# Patient Record
(demographics unavailable — no encounter records)

---

## 2025-01-02 NOTE — DISCUSSION/SUMMARY
[Normal Growth] : growth [Normal Development] : development  [No Elimination Concerns] : elimination [Continue Regimen] : feeding [No Skin Concerns] : skin [Normal Sleep Pattern] : sleep [None] : no medical problems [Anticipatory Guidance Given] : Anticipatory guidance addressed as per the history of present illness section [School] : school [Development and Mental Health] : development and mental health [Nutrition and Physical Activity] : nutrition and physical activity [Oral Health] : oral health [Safety] : safety [No Vaccines] : no vaccines needed [No Medications] : ~He/She~ is not on any medications [Patient] : patient [Parent/Guardian] : Parent/Guardian [] : The components of the vaccine(s) to be administered today are listed in the plan of care. The disease(s) for which the vaccine(s) are intended to prevent and the risks have been discussed with the caretaker.  The risks are also included in the appropriate vaccination information statements which have been provided to the patient's caregiver.  The caregiver has given consent to vaccinate. [FreeTextEntry1] : 10 year female here for well visit. Normal growth and development observed unless otherwise listed. Continue balanced diet with all food groups. Brush teeth twice a day with toothbrush. Recommend visit to dentist. Help child to maintain consistent daily routines and sleep schedule. Personal hygiene and puberty explained. School discussed. Ensure home is safe. Teach child about personal safety. Use consistent, positive discipline. Limit screen time to no more than 2 hours per day. Encourage physical activity-- recommend at least an hour per day. Return 1 year for routine well child check.    Passed hearing test in office today.   Referral to therapist and family therapist for behavior issues.  Vaccine Information Sheet(s) given for appropriate vaccines. The components of the vaccine(s) to be administered today are listed in the plan of care. We discussed common side effects and education on the vaccine was provided including the disease(s) for which the vaccine(s) are intended to prevent as well as any risks. Denies any questions. Consent was given to vaccinate. Tdap given in left arm by LPN.  Refer to dermatologist for hair loss-- diagnosed with seborrheic dermatitis in [place] and treated with griseofulvin however might be more of a tinea capitis due to the alopecia.. sending over 2 weeks worth of medication at same dosage they were giving her but mom is aware she will need to follow up with dermatologist for continuation of medication and monitoring.  Mom is requesting a full bloodwork panel as well, advised LFTs should be checked on this medication as well.

## 2025-01-02 NOTE — PHYSICAL EXAM
[Alert] : alert [No Acute Distress] : no acute distress [Normocephalic] : normocephalic [Conjunctivae with no discharge] : conjunctivae with no discharge [PERRL] : PERRL [EOMI Bilateral] : EOMI bilateral [Auricles Well Formed] : auricles well formed [Clear Tympanic membranes with present light reflex and bony landmarks] : clear tympanic membranes with present light reflex and bony landmarks [No Discharge] : no discharge [Nares Patent] : nares patent [Pink Nasal Mucosa] : pink nasal mucosa [Palate Intact] : palate intact [Nonerythematous Oropharynx] : nonerythematous oropharynx [Supple, full passive range of motion] : supple, full passive range of motion [No Palpable Masses] : no palpable masses [Symmetric Chest Rise] : symmetric chest rise [Clear to Auscultation Bilaterally] : clear to auscultation bilaterally [Regular Rate and Rhythm] : regular rate and rhythm [Normal S1, S2 present] : normal S1, S2 present [No Murmurs] : no murmurs [+2 Femoral Pulses] : +2 femoral pulses [Soft] : soft [NonTender] : non tender [Non Distended] : non distended [Normoactive Bowel Sounds] : normoactive bowel sounds [No Hepatomegaly] : no hepatomegaly [No Splenomegaly] : no splenomegaly [Patent] : patent [No fissures] : no fissures [No Abnormal Lymph Nodes Palpated] : no abnormal lymph nodes palpated [No Gait Asymmetry] : no gait asymmetry [No pain or deformities with palpation of bone, muscles, joints] : no pain or deformities with palpation of bone, muscles, joints [Normal Muscle Tone] : normal muscle tone [Straight] : straight [+2 Patella DTR] : +2 patella DTR [Cranial Nerves Grossly Intact] : cranial nerves grossly intact [No Rash or Lesions] : no rash or lesions [Dhiraj: ____] : Dhiraj [unfilled] [Dhiraj: _____] : Dhiraj [unfilled]

## 2025-01-10 NOTE — PHYSICAL EXAM
[Healthy Appearing] : healthy appearing [Well Nourished] : well nourished [Interactive] : interactive [Well formed] : well formed [Normally Set] : normally set [Normal S1 and S2] : normal S1 and S2 [Clear to Ausculation Bilaterally] : clear to auscultation bilaterally [Abdomen Soft] : soft [Abdomen Tenderness] : non-tender [] : no hepatosplenomegaly [2] : was Dhiraj stage 2 [Normal for Age] : was normal for age [Scant] : scant [Normal Appearance] : normal in appearance [Dhiraj Stage ___] : the Dhiraj stage for breast development was [unfilled] [Normal] : normal  [Goiter] : no goiter [Enlarged Diffusely] : was not enlarged [Murmur] : no murmurs

## 2025-01-10 NOTE — CONSULT LETTER
[Dear  ___] : Dear  [unfilled], [Consult Letter:] : I had the pleasure of evaluating your patient, [unfilled]. [Please see my note below.] : Please see my note below. [Consult Closing:] : Thank you very much for allowing me to participate in the care of this patient.  If you have any questions, please do not hesitate to contact me. [Sincerely,] : Sincerely, [FreeTextEntry3] : Tammy Monaco MD [facility] Physician [facility] Division of Pediatric Endocrinology

## 2025-01-10 NOTE — HISTORY OF PRESENT ILLNESS
[Premenarchal] : premenarchal [FreeTextEntry2] : Mary is a 10y7m old girl with seborrheic dermatitis s/p griseofulvin, asthma on asmanex, referred for evaluation of thyroid function. She recently moved back to [place] from [place]. She saw her pediatrician on 1/4/25 and she had the following bloodwork done: TSH 12.2 uiu/ml (high), FT4 1.1 ng/dl (normal), TPO ab negative, TG ab detectable but normal, vitamin D 15.7 ng/ml (deficienct).Pediatrician recommended supplementation with vitamin D 2000 IU daily. TFTs were obtained due to hair loss. She was not sick at the time.   She has no fatigue, constipation, diarrhea, temperature intolerance, unexpected weight changes. She has eczema diagnosed a few years ago, treated with topical cream as needed. She is supposed to moisturize and she gives mom a hard time. No thyroid compressive symptoms.   While she was living in [place], mom noticed a patch of hair loss by Mary's forehead (a small area with hair stubble) with associated itchiness. Mary saw derm and was given medication for a fungal infection, griseofulvin. It cleared but Mary did not take the full course. She moved back to [place] a few weeks ago, and since then there is a new spot of hair loss on the scalp where she parts her hair. She has dandruff and she uses selsum blue. She will see derm in [place], mom to set up an appointment.   Mary began breast development a few months ago at age 10 No family history of thyroid disease.  Meds: asmanex, albuterol as needed

## 2025-01-10 NOTE — CONSULT LETTER
[Dear  ___] : Dear  [unfilled], [Consult Letter:] : I had the pleasure of evaluating your patient, [unfilled]. [Please see my note below.] : Please see my note below. [Consult Closing:] : Thank you very much for allowing me to participate in the care of this patient.  If you have any questions, please do not hesitate to contact me. [Sincerely,] : Sincerely, [FreeTextEntry3] : Tammy Monaco MD [facility] Physician [facility] Division of Pediatric Endocrinology

## 2025-01-10 NOTE — PAST MEDICAL HISTORY
[Post-Term] : post-term [Normal Vaginal Route] : by normal vaginal route [None] : there were no delivery complications [Age Appropriate] : age appropriate developmental milestones met [FreeTextEntry1] : 8 lbs x oz

## 2025-07-09 NOTE — HISTORY OF PRESENT ILLNESS
[Premenarchal] : premenarchal [FreeTextEntry2] :  Sherita is an 11y1m old girl with seborrheic dermatitis s/p griseofulvin, asthma on asmanex, presenting for follow up of thyroid function.  She saw her pediatrician on 1/4/25 and she had the following bloodwork done: TSH 12.2 uiu/ml (high), FT4 1.1 ng/dl (normal), TPO ab negative, TG ab detectable but normal. TFTs obtained due to hair loss/allopecia. I saw her for the firs time on 1/8/25 and repeated TFTs- TSH 3.06 uiu/ml, T4 6.8 ug/dl, FT4 1.1 ng/dl, T3 140 ng/dl TBG 18 ug/ml (all normal), TPO and TG antibodies detectable but normal. Clinical monitoring was advised.  Since her last visit, SHERITA has been well with no recent illness or hospitalization.  She has no fatigue, constipation, diarrhea, temperature intolerance, unexpected weight changes. She has eczema diagnosed a few years ago, treated with topical cream as needed. eczema has bene stable. No thyroid compressive symptoms. No new areas of hair loss but she does scratch her hair frequently.  Sherita began breast development at age 10. Since last visit development has been stable.

## 2025-07-09 NOTE — PHYSICAL EXAM
[Healthy Appearing] : healthy appearing [Well Nourished] : well nourished [Interactive] : interactive [Well formed] : well formed [Normally Set] : normally set [Goiter] : no goiter [Enlarged Diffusely] : was not enlarged [Normal S1 and S2] : normal S1 and S2 [Murmur] : no murmurs [Clear to Ausculation Bilaterally] : clear to auscultation bilaterally [Abdomen Soft] : soft [Abdomen Tenderness] : non-tender [] : no hepatosplenomegaly [3] : was Dhiraj stage 3 [Normal for Age] : was normal for age [Scant] : scant [Normal Appearance] : normal in appearance [Dhiraj Stage ___] : the Dhiraj stage for breast development was [unfilled] [Normal] : normal

## 2025-07-09 NOTE — CONSULT LETTER
[Dear  ___] : Dear  [unfilled], [Consult Letter:] : I had the pleasure of evaluating your patient, [unfilled]. [Please see my note below.] : Please see my note below. [Consult Closing:] : Thank you very much for allowing me to participate in the care of this patient.  If you have any questions, please do not hesitate to contact me. [Sincerely,] : Sincerely, [FreeTextEntry3] : Tammy Monaco MD [facility] Physician [facility] Division of Pediatric Endocrinology